# Patient Record
Sex: FEMALE | Race: WHITE | NOT HISPANIC OR LATINO
[De-identification: names, ages, dates, MRNs, and addresses within clinical notes are randomized per-mention and may not be internally consistent; named-entity substitution may affect disease eponyms.]

---

## 2020-10-09 ENCOUNTER — TRANSCRIPTION ENCOUNTER (OUTPATIENT)
Age: 55
End: 2020-10-09

## 2020-10-30 ENCOUNTER — TRANSCRIPTION ENCOUNTER (OUTPATIENT)
Age: 55
End: 2020-10-30

## 2023-06-07 ENCOUNTER — APPOINTMENT (OUTPATIENT)
Dept: ORTHOPEDIC SURGERY | Facility: CLINIC | Age: 58
End: 2023-06-07
Payer: COMMERCIAL

## 2023-06-07 VITALS — WEIGHT: 158 LBS | BODY MASS INDEX: 26.01 KG/M2 | HEIGHT: 65.5 IN

## 2023-06-07 DIAGNOSIS — M48.061 SPINAL STENOSIS, LUMBAR REGION WITHOUT NEUROGENIC CLAUDICATION: ICD-10-CM

## 2023-06-07 DIAGNOSIS — M51.36 OTHER INTERVERTEBRAL DISC DEGENERATION, LUMBAR REGION: ICD-10-CM

## 2023-06-07 DIAGNOSIS — M54.16 RADICULOPATHY, LUMBAR REGION: ICD-10-CM

## 2023-06-07 PROBLEM — Z00.00 ENCOUNTER FOR PREVENTIVE HEALTH EXAMINATION: Status: ACTIVE | Noted: 2023-06-07

## 2023-06-07 PROCEDURE — 99203 OFFICE O/P NEW LOW 30 MIN: CPT

## 2023-06-12 NOTE — HISTORY OF PRESENT ILLNESS
[Gradual] : gradual [2] : 2 [Radiating] : radiating [Stabbing] : stabbing [Tightness] : tightness [Tingling] : tingling [Constant] : constant [Meds] : meds [Part time] : Work status: part time [de-identified] : 06/07/2023: Patient presents to the office for initial evaluation of lower back pain. Patient has had chronic back issues dating back to 2019 patient had treatment with physical therapy and home exercise stretching. Since the winter, she’s been complaining of back tightness with occasional electric shock sensation down the right lateral thigh stopping at her knee. Patient states about two weeks ago she had to go to the ER because of the build debilitating low back and right leg pain. Patient was given a steroid shot, anti-inflammatory shot, muscle relaxant. At this point patient pain has improved to about a 02 out of 10 at this point. Patient’s pain is primarily focused in her lower back. Patient has not participate in home exercises or physical therapy at this point. No red flags reported. Patient denies fevers, acute weakness, unexpected weight loss, urinary incontinence, and bowel incontinence.\par \par Subjective Weakness: No \par \par Numbness/Tingling: yes right shin, lateral thigh\par Bladder/Bowel dysfunction: no\par Gait Abnormalities: no\par Fine motor coordination changes:  no\par \par  \par Injections:No  \par \par Pertinent Surgical History: N/A \par \par  \par Imaging: \par \par MRI Lumbar Spine zp rad 2019\par  [] : no [FreeTextEntry5] : pain started years ago then came about 6 months ago, then about 2 weeks ago was unable to walk & went to the ER [FreeTextEntry6] : weakness [FreeTextEntry7] : rt leg [FreeTextEntry9] : naproxen, methocarbomol [de-identified] : pain mgmt, [facility] [de-identified] : mri l-spine done at [facility] [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

## 2023-06-12 NOTE — ASSESSMENT
[FreeTextEntry1] : 58 y/o female with resolving lumbar radiculopathy with ED tx of toradol IM, Steroid IM injection, and muscle relaxants.  Patient was given rx to undergo formal PT for her lumbar spine.  FUV in 6 weeks if she is refractory and to assess the need for MRI lumbar spine. \par \par Prior to appointment and during encounter with patient extensive medical records were reviewed including but not limited to, hospital records, out patient records, imaging results, and lab data. During this appointment the patient was examined, diagnoses were discussed and explained in a face to face manner. In addition extensive time was spent reviewing aforementioned diagnostic studies. Counseling including abnormal image results, differential diagnoses, treatment options, risk and benefits, lifestyle changes, current condition, and current medications was performed. Patient's comments, questions, and concerns were address and patient verbalized understanding. Based on this patient's presentation at our office, which is an orthopedic spine surgeon's office, this patient inherently / intrinsically has a risk, however minute, of developing issues such as Cauda equina syndrome, bowel and bladder changes, or progression of motor or neurological deficits such as paralysis which may be permanent. \par \par \par Nnamdi GRAY, personally performed the services described in this documentation incident to Kev France MD.

## 2023-06-12 NOTE — PHYSICAL EXAM
[de-identified] : Constitutional:  \par - No acute distress  \par - Well developed; well nourished  \par   \par Neurological:  \par - normal mood and affect  \par - alert and oriented x 3   \par   \par Cardiovascular:  \par - grossly normal\par   \par \par Lumbar Spine Exam: \par \par Inspection: \par erythema (-) \par ecchymosis (-) \par rashes (-) \par alignment: no scoliosis \par   \par Palpation: \par Midline lumbar tenderness:             (-) \par midline thoracic tenderness:          (-) \par Lumbar paraspinal tenderness:  	L (-) ; R (-) \par thoracic paraspinal tenderness:	L (-) ; R (-) \par sciatic nerve tenderness :         	L (-) ; R (-) \par SI joint tenderness:                    	L (-) ; R (-) \par GTB tenderness:                        	L (-);  R (-) \par   \par ROM:   \par full flexion extension produces mild pain\par   \par Strength: \par                                	Right   	Left    \par Hip Flexion:                (5/5)       (5/5) \par Quadriceps:               (5/5)       (5/5) \par Hamstrings:                (5/5)       (5/5) \par Ankle Dorsiflexion:    (5/5)       (5/5) \par EHL:                            (5/5)       (5/5) \par Ankle Plantarflexion:  (5/5)       (5/5) \par   \par Special Tests: \par SLR:                        	R (-) ; L (-) \par Facet loading:        	R (+) ; L (-) \par RIP test:            	R (-) ; L (-) \par Hamstring tightness:  R (-);  L (-) \par   \par Neurologic: \par SILT throughout right lower extremity \par SILT throughout left lower extremity \par   \par Reflexes normal and symmetric bilateral lower extremities \par   \par Gait: \par non- antalgic gait \par ambulates without assistive device \par \par  [] : no swelling

## 2023-06-12 NOTE — DATA REVIEWED
[Lumbar Spine] : lumbar spine [Report was reviewed and noted in the chart] : The report was reviewed and noted in the chart [FreeTextEntry1] : MRI [facility] spine zp rad 2019\par At T12-L1, the canal and foramen are clear.\par \par At L1-2, the canal and foramen are clear.\par At L2-3, the canal and foramen are clear.\par At L3-4, the canal and foramen are clear.\par At L4-5, the disc is abnormal in signal and bulges causing a mild ventral epidural defect\par on the thecal sac and mild bilateral lateral recess stenosis of the canal. The foramen are\par clear.\par At L5-S1, a 5 mm region of abnormal signal associated with a focal projection from the\par disc into the left side of the ventral epidural space is consistent with an annular tear\par and a small left-sided herniation and is displacing the epidural fat and is in contact\par with the left S1 nerve root though the root does not appear displaced. The foramen are\par clear.

## 2023-07-21 ENCOUNTER — APPOINTMENT (OUTPATIENT)
Dept: ORTHOPEDIC SURGERY | Facility: CLINIC | Age: 58
End: 2023-07-21